# Patient Record
Sex: FEMALE | Race: WHITE
[De-identification: names, ages, dates, MRNs, and addresses within clinical notes are randomized per-mention and may not be internally consistent; named-entity substitution may affect disease eponyms.]

---

## 2017-03-19 NOTE — RADRPT
PROCEDURE:   CT Brain without contrast 

 

CLINICAL INDICATION:   Headache status post trauma

 

TECHNIQUE:   A CT of the brain was performed on multidetector high-resolution CT scanner utilizing a
xial sections from the skull base through the vertex without contrast.  The scan was reviewed in sof
t tissue brain and high frequency resolution bone algorithm windows.  Images were reviewed on a high
-resolution PACS workstation. The exam CTDI = 44.97 mGy and the DLP = 720.23 mGy-cm. 

 

One or more of the following dose reduction techniques were used:

 

   - Automated exposure control.

   - Adjustment of the mA and/or kV according to patient size.

   - Use of iterative reconstruction technique.

 

COMPARISON:   None available  

 

FINDINGS:

 

The ventricles and sulci are symmetric and normal in size and morphology.  There is no evidence of i
ntracranial hemorrhage, mass effect, edema or midline shift.  No abnormal intra-axial or extra-axial
 fluid collections are seen.  The density of the brain is normal and the gray/white matter different
iation is well preserved.  Brainstem and posterior fossa structures are equally unremarkable.  The o
sseous structures and visualized paranasal sinuses are unremarkable.  The surrounding soft tissue sc
alp and bony calvarium are intact and normal.  

 

IMPRESSION:

1.  Unremarkable CT brain.   

 

 

RPTAT: HJES

_____________________________________________ 

.Kole Miles MD, MD           Date    Time 

Electronically viewed and signed by .Kole Miles MD, MD on 03/19/2017 01:10 

 

D:  03/19/2017 01:10  T:  03/19/2017 01:10

.S/

## 2017-03-19 NOTE — ERD
ER Documentation


Chief Complaint


Date/Time


DATE: 3/19/17 


TIME: 03:52


Chief Complaint


Hit head on the wall 5 days ago





HPI


Patient is a 66-year-old female past medical history of depression and anxiety 

who presents to the emergency department with concerns of a head injury.  

Patient states 5 days ago she was playing something up off the floor and she 

hit her head against the wall.  Since that time patient has had intermittent 

episodes of head pain.  Vision states that she is taking ibuprofen for symptoms 

however her headaches return.  Patient denies sudden onset.  Patient denies any 

blurry vision or vision loss.  Patient denies any nausea, vomiting, loss of 

consciousness, acute confusion, excessive sleepiness.  Patient is ambulating 

without any difficulty.  Patient denies any chest pain, shortness of breath, 

arm pain or diaphoresis.





ROS


All systems reviewed and are negative except as per history of present illness.





Medications


Home Meds


Active Scripts


Acetaminophen* (Tylophen*) 500 Mg Capsule, 1 CAP PO Q6H Y for PAIN AND OR 

ELEVATED TEMP, #20 CAP


   Prov:EMILIANO MELGOZA PA-C         3/19/17


Reported Medications


[None]   No Conflict Check


   16





Allergies


Allergies:  


Coded Allergies:  


     No Known Allergy (Unverified , 2/15/16)





PMhx/Soc


History of Surgery:  Yes (HYSTERECTOMY, RIGHT WRIST SX, ,)


Anesthesia Reaction:  No


Hx Neurological Disorder:  Yes (MINI STROKE IN THE PAST)


Hx Respiratory Disorders:  No


Hx Cardiac Disorders:  No


Hx Psychiatric Problems:  Yes (ANXIETY AND DEPRESSION)


Hx Miscellaneous Medical Probl:  No


Hx Alcohol Use:  No


Hx Substance Use:  No


Hx Tobacco Use:  No


Smoking Status:  Never smoker





Physical Exam


Vitals





Vital Signs








  Date Time  Temp Pulse Resp B/P Pulse Ox O2 Delivery O2 Flow Rate FiO2


 


3/19/17 02:05  65 20 128/56 99   


 


3/18/17 22:26 98.7 63 20 96/47 99   








Physical Exam


GENERAL: Well-developed, well-nourished female. Appears in no acute distress.  

Speaking in full sentences


HEAD: Normocephalic, atraumatic. No deformities or ecchymosis.  No scalp 

hematomas noted.  No bilateral mastoid process ecchymosis noted.


EYE: Pupils equal, round, and reactive to light. EOMs intact. No conjunctival 

erythema. No eye discharge.  No periorbital ecchymosis or swelling noted 

bilaterally.


ENT: External ear without any masses or tenderness. Auditory canals clear 

bilaterally.  No hematotympanum noted bilaterally.  TM visualized bilaterally, 

non-erythematous, non-bulging. Nasal mucosa pink with no discharge. Oropharynx 

is pink without any tonsillar erythema or exudates. No uvula deviation. No 

kissing tonsils. 


NECK: Supple. No meningismus. Normal ROM of the neck.


LUNG: Clear to auscultation bilaterally. No rhonchi, wheezing, rales or coarse 

breath sounds. 


HEART: Regular rate and rhythm. No murmurs, rubs or gallops.


ABDOMEN: Soft, nontender, and nondistended. Positive bowel sounds in all four 

quadrants. No rebound tenderness, no guarding. (-) McBurney's point tenderness.

  No CVA tenderness.


BACK: No midline tenderness. 


EXTREMITIES: Equal pulses bilaterally. No peripheral clubbing, cyanosis or 

edema. No unilateral leg swelling.  


NEUROLOGIC: Alert and oriented x3, cooperative. Mood and affect appropriate to 

situation. Cranial nerves II through XII are grossly intact. Normal speech. 

Motor exam: 5/5 strength in upper and lower extremities. Sensory exam: 

Sensation intact to light touch on all four extremities. Cerebellar function 

exam: Rapid alternating movements intact. No dysmetria on finger-to-nose test. 

Steady gait. No pronator drift.


SKIN: Normal color. Warm and dry. No rashes or lesions.





Procedures/MDM


ED COURSE:


The patient was stable throughout ED course. I kept the patient and/or family 

informed of laboratory and diagnostic imaging results throughout the ED course.

  





 


DIAGNOSTIC IMAGING:


Read by radiologist.


 Patient: DESTINEY REDDING   : 1950   Age: 66  Sex: F                      

  


 MR #:    [id number]   [facility] #:   [id number]    DOS: 17 0032


 Ordering MD: EMILIANO MELGOZA PA-C   Location:  FTE   Room/Bed:                 

                           


 








PROCEDURE:   CT Brain without contrast 


 


CLINICAL INDICATION:   Headache status post trauma


 


TECHNIQUE:   A CT of the brain was performed on multidetector high-resolution 

CT scanner utilizing axial sections from the skull base through the vertex 

without contrast.  The scan was reviewed in soft tissue brain and high 

frequency resolution bone algorithm windows.  Images were reviewed on a high-

resolution PACS workstation. The exam CTDI = 44.97 mGy and the DLP = 720.23 mGy-

cm. 


 


One or more of the following dose reduction techniques were used:


 


   - Automated exposure control.


   - Adjustment of the mA and/or kV according to patient size.


   - Use of iterative reconstruction technique.


 


COMPARISON:   None available  


 


FINDINGS:


 


The ventricles and sulci are symmetric and normal in size and morphology.  

There is no evidence of intracranial hemorrhage, mass effect, edema or midline 

shift.  No abnormal intra-axial or extra-axial fluid collections are seen.  The 

density of the brain is normal and the gray/white matter differentiation is 

well preserved.  Brainstem and posterior fossa structures are equally 

unremarkable.  The osseous structures and visualized paranasal sinuses are 

unremarkable.  The surrounding soft tissue scalp and bony calvarium are intact 

and normal.  


 


IMPRESSION:


1.  Unremarkable CT brain.   


 


 


RPTAT: HJES


_____________________________________________ 


.Kole Miles MD, MD           Date    Time 


Electronically viewed and signed by .Kole Miles MD, MD on 2017 01:10 


 


D:  2017 01:10  T:  2017 01:10


.S/





CC: EMILIANO MELGOZA PA-C





MEDICAL DECISION MAKING:


This is a 66-year-old female who presents with concerns of a head injury after 

hitting her head against the wall 5 days ago.  Patient reports intermittent 

aches over the last few days.  Patient denied any nausea, vomiting, loss of 

consciousness, confusion, excessive sleepiness.  Vital signs were reviewed. 

Patient was afebrile. Patient was not hypoxic.  Given the patient's ongoing 

symptoms and age, head CT was ordered.  Head CT was unremarkable.  At this time

, the patient's presentation is most consistent with head contusion.  Low 

suspicion for intracranial hemorrhage, intracranial edema, mass-effect or 

midline shift. Low suspicion for meningitis, benign intracranial hypertension, 

sinusitis, migraine, cervical spine injury, or any other neuro deficits.





PRESCRIPTIONS:


Tylenol 





DISCHARGE:


At this time, patient is stable for discharge and outpatient management.  

Strict head injury precautions were discussed with the patient.. I have 

instructed the family to monitor the patient closely and return to the ER 

immediately for any new or worsening symptoms including increased pain, headache

, nausea, vomiting, weakness, numbness, confusion, excessive sleepiness, 

seizures or LOC. Patient should follow-up with his/her primary care physician 

in 1-2 days. The patient and/or family expressed understanding of and agreement 

with this plan. All questions were answered. Home care instructions were 

provided.





Departure


Diagnosis:  


 Primary Impression:  


 Head injury


Condition:  Good


Patient Instructions:  HEAD INJURY, No Wake-Up (Adult)


Referrals:  


[facility]


YOU HAVE RECEIVED A MEDICAL SCREENING EXAM AND THE RESULTS INDICATE THAT YOU DO 

NOT HAVE A CONDITION THAT REQUIRES URGENT TREATMENT IN THE EMERGENCY DEPARTMENT.





FURTHER EVALUATION AND TREATMENT OF YOUR CONDITION CAN WAIT UNTIL YOU ARE SEEN 

IN YOUR DOCTORS OFFICE WITHIN THE NEXT 1-2 DAYS. IT IS YOUR RESPONSIBILITY TO 

MAKE AN APPOINTMENT FOR FOLOW-UP CARE.





IF YOU HAVE A PRIMARY DOCTOR


--you should call your primary doctor and schedule an appointment





IF YOU DO NOT HAVE A PRIMARY DOCTOR YOU CAN CALL OUR PHYSICIAN REFERRAL HOTLINE 

AT


 [phone] 





IF YOU CAN NOT AFFORD TO SEE A PHYSICIAN YOU CAN CHOSE FROM THE FOLLOWING 

[facility] [phone] [address] [facility]. [facility] [phone] [address] [facility]. [facility] [phone] [address] [facility] [phone] [address] [facility]. [facility] [phone] [address] [facility]. [facility]. [phone] [address] [facility]. [facility]


YOU HAVE RECEIVED A MEDICAL SCREENING EXAM AND THE RESULTS INDICATE THAT YOU DO 

NOT HAVE A CONDITION THAT REQUIRES URGENT TREATMENT IN THE EMERGENCY DEPARTMENT.





FURTHER EVALUATION AND TREATMENT OF YOUR CONDITION CAN WAIT UNTIL YOU ARE SEEN 

IN YOUR DOCTORS OFFICE WITHIN THE NEXT 1-2 DAYS. IT IS YOUR RESPONSIBILITY TO 

MAKE AN APPOINTMENT FOR FOLOW-UP CARE.





IF YOU HAVE A PRIMARY DOCTOR


--you should call your primary doctor and schedule and appointment





IF YOU DO NOT HAVE A PRIMARY DOCTOR YOU CAN CALL OUR PHYSICIAN REFERRAL HOTLINE 

AT [phone].





IF YOU CAN NOT AFFORD TO SEE A PHYSICIAN YOU CAN CHOSE FROM THE FOLLOWING 

Psychiatric hospital INSTITUTIONS:





[facility]


[address] [place] [address]





[facility]


[address] [place] [address]





[facility]


[address] [place] [address]





Additional Instructions:  


Strict head injury precautions were discussed with the patient.  Patient was 

advised to return to the ER for any severe pain, nausea, vomiting, loss of 

consciousness, acute confusion, excessive sleepiness.





Call your primary care doctor TOMORROW for an appointment during the next 1-2 

days.See the doctor sooner or return here if your condition worsens before your 

appointment time.











EMILIANO MELGOZA PA-C Mar 19, 2017 04:00

## 2018-11-23 ENCOUNTER — HOSPITAL ENCOUNTER (EMERGENCY)
Age: 68
Discharge: HOME | End: 2018-11-23

## 2018-11-23 ENCOUNTER — HOSPITAL ENCOUNTER (EMERGENCY)
Dept: HOSPITAL 91 - FTE | Age: 68
Discharge: HOME | End: 2018-11-23
Payer: COMMERCIAL

## 2018-11-23 DIAGNOSIS — Z86.73: ICD-10-CM

## 2018-11-23 DIAGNOSIS — S29.002A: ICD-10-CM

## 2018-11-23 DIAGNOSIS — S49.91XA: Primary | ICD-10-CM
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
## 2018-11-23 DIAGNOSIS — M79.601: ICD-10-CM

## 2018-11-23 PROCEDURE — 99284 EMERGENCY DEPT VISIT MOD MDM: CPT

## 2018-11-23 PROCEDURE — 72072 X-RAY EXAM THORAC SPINE 3VWS: CPT

## 2018-11-23 PROCEDURE — 73030 X-RAY EXAM OF SHOULDER: CPT

## 2018-11-23 PROCEDURE — 71045 X-RAY EXAM CHEST 1 VIEW: CPT
